# Patient Record
(demographics unavailable — no encounter records)

---

## 2025-05-05 NOTE — HISTORY OF PRESENT ILLNESS
[de-identified] : Patient is a 25-year-old male here for evaluation of his right shoulder.  He states that yesterday, May 4, he was playing soccer when he fell landed directly on his right side.  He has been having some pain since then.  The pain is tolerable but does cause him some discomfort.

## 2025-05-05 NOTE — DATA REVIEWED
[FreeTextEntry1] :   3 x-ray views taken in the office today of his bilateral shoulders for comparison show No fractures, bony abnormalities, or AC joint separation.

## 2025-05-05 NOTE — DISCUSSION/SUMMARY
[de-identified] :  I explained the x-rays to the patient.  Has a right shoulder contusion/AC joint sprain.  He will ice and rest his shoulder.  I provided him with a sling for support.  He will avoid any overhead or heavy lifting.  He will avoid contact physical activities.  He understands his injuries could take 4 to 6 weeks to heal.  The first 2 to 3 weeks are typically the worst.  He will follow-up in a few weeks for repeat evaluation if he has continued pain.  All questions were answered today.  He has

## 2025-05-05 NOTE — PHYSICAL EXAM
[de-identified] : Physical exam of his right shoulder: Mild swelling over the AC joint.  Negative ecchymosis.  Mildly tender over the AC joint.  Mild tenderness over the midshaft of the clavicle.  He has good range of motion of the shoulder with pain.  He has a negative Speed.  He has a positive Pike.  Negative impingement and apprehension.  Strength is 5 out of 5 with pain.  His sensory and motor are intact.